# Patient Record
(demographics unavailable — no encounter records)

---

## 2024-10-30 NOTE — HISTORY OF PRESENT ILLNESS
[de-identified] : The patient is a 16 year old male presenting for  FUV for the R shoulder.  Pain:    At Rest: 5/10  With Activity:  6/10  Mechanism of injury:  Patient reports that since scrimmage 8/30/24, the shoulder has been throbbing. No notable injury.  This is not a Work Related Injury being treated under Worker's Compensation. This is not an athletic injury occurring associated with an interscholastic or organized sports team. 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 Reports inc pain and apprehension for dislocation with overhead motions or reaching behind his back.

## 2024-10-30 NOTE — DATA REVIEWED
[FreeTextEntry1] : 10/29/24 OC X-RAY Right Shoulder 3 views: This scan was reviewed and interpreted by Dr. Aldridge, and his findings are- mild OA, Hill-Sachs lesion

## 2024-10-30 NOTE — DISCUSSION/SUMMARY
[de-identified] : Reviewed all X Ray images with patient today and interpretation was provided. 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 ATTENTION DR. FONTAINE. CT Scan to eval glenoid bone loss and hill sachs for surgical planning. Discussed treatment options, including surgery. Patient would like to follow through with surgery. Doctor email provided to patient. Patient will get in contact with the  to schedule surgery.     ***[employer]*** ***Patient wishes to continue to football season***  Surgical Consent:  -Conservative treatment, nontreatment, nonsurgical intervention and surgical intervention treatment options have been reviewed with the patient.  The patient continues to be symptomatic [and has failed conservative treatment], and elects to move forward with surgical intervention.  The patient is indicated for RIGHT SHOULDER REVISION ARTHROSCOPIC ANTERIOR AND SUPERIOR LABRAL REPAIR, REMOVAL OF DEEP HARDWARE, REMPLISSAGE PROCEDURE and all indicated procedures. As such the alternatives, benefits and risks, of the above procedure, including but not limited to bleeding, infection, neurovascular injury, loss of limb, loss of life,  DVT, PE, RSD, inability to return to previous level of activity, inability to return to previous level of employment, advancement of or to osteoarthritic changes, joint instability or motion loss, hardware failure or migration,  failure to resolve all symptoms, failure to return to sports and need for further procedures, as well as specific risk of [RE-TEAR, ARTHROFIBROSIS] were discussed with the patient and/or their legal guardian who agreed to move forward with surgical intervention.  They have reviewed and signed the consent form today after expressing understanding of the above documented conversation. The patient or their representative will contact my office as instructed on the preoperative instruction sheet they received today to schedule surgery in a timely manner as discussed.  -As a post-operative protocol, I am prescribing an iceless cold/heat compression therapy device for at home use to be used 3-5 times per day at 40 degrees for 35 days as an alternative to pain medication. I would like my patient to begin with simultaneous cold & compression therapy at 10mm pressure. At the patients follow up I will determine whether they should continue with cold, or if they should transition to contrast cold/heat compression therapy. Unlike a conventional cold therapy unit that requires ice, the ThermX iceless device is set to a prescribed temperature that it will remain throughout the entire duration of use, whether that be cold compression, heat compression, or contrast compression. Cold therapy units that depend on ice melt over a very short period and do not provide compression which limits the compliance and effectiveness for pain/inflammation reduction that I am targeting for my patient. I have reached out to [facility] to supply this device as they are the exclusive provider of the ThermX and the patient will be contacted and instructed on how to utilize the device. ------------    ----------------------------------------------- Home Exercise The patient is instructed on a home exercise program.  YUAN HEALY Acting as a Scribe for Dr. Luis Carlos FRANCIS, Yuan Healy, attest that this documentation has been prepared under the direction and in the presence of Provider Dr. Aldridge.  Activity Modification The patient was advised to modify their activities.  Dx / Natural History The patient was advised of the diagnosis. The natural history of the pathology was explained in full to the patient in layman's terms. Several different treatment options were discussed and explained in full to the patient including the risks and benefits of both surgical and non-surgical treatments.  All questions and concerns were answered.  Pain Guide Activities The patient was advised to let pain guide the gradual advancement of activities.  RICE I explained to the patient that rest, ice, compression, and elevation would benefit them. They may return to activity after follow-up or when they no longer have any pain.  The patient's current medication management of their orthopedic diagnosis was reviewed today: (1) We discussed a comprehensive treatment plan that included possible pharmaceutical management involving the use of prescription strength medications including but not limited to options such as oral Naprosyn 500mg BID, once daily Meloxicam 15 mg, or 500-650 mg Tylenol versus over the counter oral medications and topical prescription NSAID Pennsaid vs over the counter Voltaren gel. (2) There is a moderate risk of morbidity with further treatment, especially from use of prescription strength medications and possible side effects of these medications which include upset stomach with oral medications, skin reactions to topical medications and cardiac/renal issues with long term use. (3) I recommended that the patient follow-up with their medical physician to discuss any significant specific potential issues with long term medication use such as interactions with current medications or with exacerbation of underlying medical comorbidities. (4) The benefits and risks associated with use of injectable, oral or topical, prescription and over the counter anti-inflammatory medications were discussed with the patient. The patient voiced understanding of the risks including but not limited to bleeding, stroke, kidney dysfunction, heart disease, and were referred to the black box warning label for further information.

## 2024-10-30 NOTE — ADDENDUM
[FreeTextEntry1] : Documented by Osorio Healy acting as a scribe for Dr. Aldridge and Carlos Torres PA-C on 10/29/2024 and was presence for the following sections: Physical Exam; Data Reviewed; Assessment; Discussion/Summary. All medical record entries made by the Scribe were at my, Dr. Aldridge, and Carlos Torres, direction and personally dictated by me on 10/29/2024. I have reviewed the chart and agree that the record accurately reflects my personal performance of the history, physical exam, procedure and imaging.

## 2024-10-30 NOTE — PHYSICAL EXAM
[de-identified] : Neurovascularly intact distally  Right Shoulder: anterior shoulder tenderness +obreins +anterior load and shift +anterior crank test anterior apprehension

## 2024-11-14 NOTE — HISTORY OF PRESENT ILLNESS
[de-identified] : 11/14/2024: The patient is a 16 year old M, R hand dominant who presents today complaining of right shoulder pain. Date of Injury/Onset: 09/06/24 Julio was playing football when he tackled another player and his shoulder was in immediate. Pain:    At Rest: 5/10 With Activity:  7/10 Mechanism of injury: Football Contact injury. This is NOT a Work Related Injury being treated under Worker's Compensation. This IS an athletic injury occurring associated with an interscholastic or organized sports team. Quality of symptoms: Sharp pain and numbness  Improves with: Nothing helps the pain get better. Worse with: OH motions, any acitvity Prior treatment: Dr. Aldridge Prior imaging: X-ray and MRA in chart Previous injury: Had labral arthrorscopy with Dr. 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

## 2024-11-14 NOTE — HISTORY OF PRESENT ILLNESS
[de-identified] : 11/14/2024: The patient is a 16 year old M, R hand dominant who presents today complaining of right shoulder pain. Date of Injury/Onset: 09/06/24 Julio was playing football when he tackled another player and his shoulder was in immediate. Pain:    At Rest: 5/10 With Activity:  7/10 Mechanism of injury: Football Contact injury. This is NOT a Work Related Injury being treated under Worker's Compensation. This IS an athletic injury occurring associated with an interscholastic or organized sports team. Quality of symptoms: Sharp pain and numbness  Improves with: Nothing helps the pain get better. Worse with: OH motions, any acitvity Prior treatment: Dr. Aldridge Prior imaging: X-ray and MRA in chart Previous injury: Had labral arthrorscopy with Dr. 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

## 2024-11-14 NOTE — DATA REVIEWED
[CT Scan] : CT scan [MRI] : MRI [Right] : of the right [Shoulder] : shoulder [Report was reviewed and noted in the chart] : The report was reviewed and noted in the chart [I independently reviewed and interpreted images and report] : I independently reviewed and interpreted images and report [I reviewed the films/CD] : I reviewed the films/CD [FreeTextEntry1] : DARRELL 11/7/24:  CT: 1. Evidence of previous anterior shoulder dislocation. There is a moderate Hill-Sachs deformity at the posterolateral aspect of the humeral head. 2. 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 There is mild medial displacement of the bony Bankart fragment. 3. There is evidence of previous labral repair with suture anchor tracts extending from anterior superior to anterior inferior. 4. Small subchondral cyst along the posterior inferior glenoid corresponds to the region of chondral defect seen on the MR arthrogram performed on the same day.   MRA: 1. Evidence of previous anterior shoulder dislocation with a Hill-Sachs deformity of the humeral head and a chronic bony Bankart lesion. 2. There is a recurrent tear of the anterior inferior labrum and the labral fibers are retracted medially and scarred to the anterior joint capsule. There is also shallow tearing of the superior labrum above the equator. 3. There is moderate cartilage loss at the superior medial aspect of the humeral head and there is a small chondral defect at the posterior inferior glenoid. 4. There is moderate scarring of the joint capsule and irregularity of the joint capsule at the scapular attachment indicative of previous partial tear. 5. There is no rotator cuff tear

## 2024-11-14 NOTE — DISCUSSION/SUMMARY
[de-identified] : We discussed their diagnosis and treatment options at length including the risks and benefits of both surgical and non-surgical options.  - I do think they are a candidate for surgery given their dislocation event, young age, and active lifestyle in order to stabilize their shoulder, decrease their risk of another dislocation event, and prevent further chondral damage and the long term development of arthritis. - The pros and cons along with the risks, benefits, and alternatives to right shoulder surgical arthroscopy and open latarjet were discussed with the patient, all questions were answered  Conservative treatment, nontreatment, nonsurgical intervention and surgical intervention treatment options have been reviewed with the patient.  The patient continues to be symptomatic and has failed conservative treatment, and elects to move forward with surgical intervention.  The patient is indicated for [the above procedure] and all indicated procedures. As such the alternatives, benefits and risks, of the above procedure, including but not limited to bleeding, infection, neurovascular injury, loss of limb, loss of life,  DVT, PE, RSD, inability to return to previous level of activity, inability to return to previous level of employment, advancement of or to osteoarthritic changes, joint instability or motion loss, hardware failure or migration, malunion or nonunion, failure to resolve all symptoms, failure to return to sports and need for further procedures were discussed with the patient and/or their legal guardian who agreed to move forward with surgical intervention.  They have reviewed and signed the consent form today after expressing understanding of the above documented conversation. The patient or their representative will contact my office as instructed on the preoperative instruction sheet they received today to schedule surgery in a timely manner as discussed.

## 2024-11-14 NOTE — IMAGING
[de-identified] :  RIGHT SHOULDER  Inspection: No swelling.  Palpation: Tenderness is noted at the anterior shoulder.  Range of motion: Full range of motion but with some pain. Strength: There is pain and discomfort with strength testing.  Neurological testing: motor and sensor intact distally. Ligament Stability and Special Tests:  Shoulder apprehension: pos Shoulder relocation: pos Obriens test: pos Biceps Active test: pos Tang Labral Shear: pos Impingement testing: neg Melisa testing: pain Whipple: neg Cross Body Adduction: neg

## 2024-11-14 NOTE — IMAGING
[de-identified] :  RIGHT SHOULDER  Inspection: No swelling.  Palpation: Tenderness is noted at the anterior shoulder.  Range of motion: Full range of motion but with some pain. Strength: There is pain and discomfort with strength testing.  Neurological testing: motor and sensor intact distally. Ligament Stability and Special Tests:  Shoulder apprehension: pos Shoulder relocation: pos Obriens test: pos Biceps Active test: pos Tang Labral Shear: pos Impingement testing: neg Melisa testing: pain Whipple: neg Cross Body Adduction: neg

## 2024-11-14 NOTE — DISCUSSION/SUMMARY
[de-identified] : We discussed their diagnosis and treatment options at length including the risks and benefits of both surgical and non-surgical options.  - I do think they are a candidate for surgery given their dislocation event, young age, and active lifestyle in order to stabilize their shoulder, decrease their risk of another dislocation event, and prevent further chondral damage and the long term development of arthritis. - The pros and cons along with the risks, benefits, and alternatives to right shoulder surgical arthroscopy and open latarjet were discussed with the patient, all questions were answered  Conservative treatment, nontreatment, nonsurgical intervention and surgical intervention treatment options have been reviewed with the patient.  The patient continues to be symptomatic and has failed conservative treatment, and elects to move forward with surgical intervention.  The patient is indicated for [the above procedure] and all indicated procedures. As such the alternatives, benefits and risks, of the above procedure, including but not limited to bleeding, infection, neurovascular injury, loss of limb, loss of life,  DVT, PE, RSD, inability to return to previous level of activity, inability to return to previous level of employment, advancement of or to osteoarthritic changes, joint instability or motion loss, hardware failure or migration, malunion or nonunion, failure to resolve all symptoms, failure to return to sports and need for further procedures were discussed with the patient and/or their legal guardian who agreed to move forward with surgical intervention.  They have reviewed and signed the consent form today after expressing understanding of the above documented conversation. The patient or their representative will contact my office as instructed on the preoperative instruction sheet they received today to schedule surgery in a timely manner as discussed.

## 2024-12-09 NOTE — IMAGING
[de-identified] : RIGHT SHOULDER Inspection: incisions c/d/i Palpation: No Tenderness is noted  Range of motion: in sling Strength:  Neurological testing: motor and sensor intact distally. Ligament Stability and Special Tests:  Shoulder apprehension:  Shoulder relocation:  Obriens test: Biceps Active test: Tang Labral Shear:  Impingement testing:  Melisa testing: Cross Body Adduction:

## 2024-12-09 NOTE — IMAGING
[de-identified] : RIGHT SHOULDER Inspection: incisions c/d/i Palpation: No Tenderness is noted  Range of motion: in sling Strength:  Neurological testing: motor and sensor intact distally. Ligament Stability and Special Tests:  Shoulder apprehension:  Shoulder relocation:  Obriens test: Biceps Active test: Tang Labral Shear:  Impingement testing:  Melisa testing: Cross Body Adduction:

## 2024-12-12 NOTE — PHYSICAL EXAM
[de-identified] : RIGHT SHOULDER Inspection: incisions c/d/i Palpation: No Tenderness is noted  Range of motion: in sling Strength:  Neurological testing: motor and sensor intact distally. Ligament Stability and Special Tests:  Shoulder apprehension:  Shoulder relocation:  Obriens test: Biceps Active test: Tang Labral Shear:  Impingement testing:  Melisa testing: Cross Body Adduction:

## 2024-12-12 NOTE — DISCUSSION/SUMMARY
[de-identified] : Right X-Ray Examination of the SHOULDER (2 views): no fractures, subluxations or dislocations. Surgical hardware in place s/p coracoid transfer Right X-Ray Examination of the SCAPULA 1 or 2 views shows: no significant abnormalities   The patient is approximately 2 weeks postoperative. s/p right shoulder laterjet and loose body removal DOS: 11/26/2024  Sutures removed and Steri Strips applied today. The patient is instructed in wound management. The patient's post-op plan, protocol and activity modifications have been thoroughly discussed and the patient expressed understanding. The patient will control pain as discussed & continue ice and elevation as needed. The patient otherwise may advance activity as discussed.   Arthroscopy photos were reviewed in great detail.  Discussed screw placement seen on x-ray with patient and Dad. Discussed inferior screw may not have optimal purchase, and we will continue to monitor this and make sure no migration of screw over time. Discussed potential need for MACEY in future pending future x-rays and progress with PT.   Prescription Medications Ordered: [None]   Physical Therapy: [Start per protocol, new prescription given today, continue home exercise program]   Braces/DME Ordered: [Continue Postop Brace]   Activity/Work/Sports Status: [Out of work/gym/sports]   Follow-Up: 2 weeks with new XR

## 2024-12-12 NOTE — HISTORY OF PRESENT ILLNESS
[de-identified] : 12/09/2024 : patient present today for POV #1 s/p right shoulder laterjet and loose body removal DOS: 11/26/2024 Pain:     At Rest: 0-3/10 With Activity: 8/10 Quality Of Symptoms: pins in needles general shoulder Since last visit: Pt is doing well postoperatively, denies fever/chills/nausea/vomiting. He has been compliant w/ sling.   11/14/2024: The patient is a 16 year old M, R hand dominant who presents today complaining of right shoulder pain. Date of Injury/Onset: 09/06/24 Julio was playing football when he tackled another player and his shoulder was in immediate. Pain:    At Rest: 5/10 With Activity:  7/10 Mechanism of injury: Football Contact injury. This is NOT a Work Related Injury being treated under Worker's Compensation. This IS an athletic injury occurring associated with an interscholastic or organized sports team. Quality of symptoms: Sharp pain and numbness  Improves with: Nothing helps the pain get better. Worse with: OH motions, any acitvity Prior treatment: Dr. Aldridge Prior imaging: X-ray and MRA in chart Previous injury: Had labral arthrorscopy with Dr. 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

## 2024-12-12 NOTE — HISTORY OF PRESENT ILLNESS
[de-identified] : 12/09/2024 : patient present today for POV #1 s/p right shoulder laterjet and loose body removal DOS: 11/26/2024 Pain:     At Rest: 0-3/10 With Activity: 8/10 Quality Of Symptoms: pins in needles general shoulder Since last visit: Pt is doing well postoperatively, denies fever/chills/nausea/vomiting. He has been compliant w/ sling.   11/14/2024: The patient is a 16 year old M, R hand dominant who presents today complaining of right shoulder pain. Date of Injury/Onset: 09/06/24 Julio was playing football when he tackled another player and his shoulder was in immediate. Pain:    At Rest: 5/10 With Activity:  7/10 Mechanism of injury: Football Contact injury. This is NOT a Work Related Injury being treated under Worker's Compensation. This IS an athletic injury occurring associated with an interscholastic or organized sports team. Quality of symptoms: Sharp pain and numbness  Improves with: Nothing helps the pain get better. Worse with: OH motions, any acitvity Prior treatment: Dr. Aldridge Prior imaging: X-ray and MRA in chart Previous injury: Had labral arthrorscopy with Dr. 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

## 2024-12-12 NOTE — DISCUSSION/SUMMARY
[de-identified] : Right X-Ray Examination of the SHOULDER (2 views): no fractures, subluxations or dislocations. Surgical hardware in place s/p coracoid transfer Right X-Ray Examination of the SCAPULA 1 or 2 views shows: no significant abnormalities   The patient is approximately 2 weeks postoperative. s/p right shoulder laterjet and loose body removal DOS: 11/26/2024  Sutures removed and Steri Strips applied today. The patient is instructed in wound management. The patient's post-op plan, protocol and activity modifications have been thoroughly discussed and the patient expressed understanding. The patient will control pain as discussed & continue ice and elevation as needed. The patient otherwise may advance activity as discussed.   Arthroscopy photos were reviewed in great detail.  Discussed screw placement seen on x-ray with patient and Dad. Discussed inferior screw may not have optimal purchase, and we will continue to monitor this and make sure no migration of screw over time. Discussed potential need for MACEY in future pending future x-rays and progress with PT.   Prescription Medications Ordered: [None]   Physical Therapy: [Start per protocol, new prescription given today, continue home exercise program]   Braces/DME Ordered: [Continue Postop Brace]   Activity/Work/Sports Status: [Out of work/gym/sports]   Follow-Up: 2 weeks with new XR

## 2024-12-12 NOTE — PHYSICAL EXAM
[de-identified] : RIGHT SHOULDER Inspection: incisions c/d/i Palpation: No Tenderness is noted  Range of motion: in sling Strength:  Neurological testing: motor and sensor intact distally. Ligament Stability and Special Tests:  Shoulder apprehension:  Shoulder relocation:  Obriens test: Biceps Active test: Tang Labral Shear:  Impingement testing:  Melisa testing: Cross Body Adduction:

## 2024-12-23 NOTE — HISTORY OF PRESENT ILLNESS
[de-identified] : 12/23/2024 : patient present today for POV #2 s/p right shoulder laterjet and loose body removal DOS: 11/26/2024 Pain:     At Rest: 0/10 With Activity: 5/10 Quality Of Symptoms: general shoulder throbbing, worse at night, some persistent pins and needles in the forearm Since last visit: Pt is doing well. He is in PT 2-3x/wk @ professional PT in [facility].   12/09/2024 : patient present today for POV #1 s/p right shoulder laterjet and loose body removal DOS: 11/26/2024 Pain:     At Rest: 0-3/10 With Activity: 8/10 Quality Of Symptoms: pins in needles general shoulder Since last visit: Pt is doing well postoperatively, denies fever/chills/nausea/vomiting. He has been compliant w/ sling.   11/14/2024: The patient is a 16 year old M, R hand dominant who presents today complaining of right shoulder pain. Date of Injury/Onset: 09/06/24 Julio was playing football when he tackled another player and his shoulder was in immediate. Pain:    At Rest: 5/10 With Activity:  7/10 Mechanism of injury: Football Contact injury. This is NOT a Work Related Injury being treated under Worker's Compensation. This IS an athletic injury occurring associated with an interscholastic or organized sports team. Quality of symptoms: Sharp pain and numbness  Improves with: Nothing helps the pain get better. Worse with: OH motions, any acitvity Prior treatment: Dr. Aldridge Prior imaging: X-ray and MRA in chart Previous injury: Had labral arthrorscopy with Dr. 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

## 2024-12-23 NOTE — REASON FOR VISIT
[Parent] : parent [FreeTextEntry2] : POV #2 s/p right shoulder laterjet and loose body removal DOS: 11/26/2024 0

## 2024-12-23 NOTE — PHYSICAL EXAM
[de-identified] : RIGHT SHOULDER Inspection: well healed surg scars Palpation: No Tenderness is noted  Range of motion: range of motion limited, ER to neutral,  Strength: strength is improving Neurological testing: motor and sensor intact distally. Ligament Stability and Special Tests:  Shoulder apprehension: neg Shoulder relocation: neg Obriens test: Biceps Active test: Tang Labral Shear:  Impingement testing:  Melisa testing: Cross Body Adduction:

## 2024-12-23 NOTE — DISCUSSION/SUMMARY
[de-identified] : The patient is approximately 6 weeks postoperative. s/p right shoulder laterjet and loose body removal DOS: 11/26/2024  Patient is with mom. Incision(s) appear to be healing well. The patient is instructed in wound management. The patient's post-op plan, protocol and activity modifications have been thoroughly discussed and the patient expressed understanding. The patient will control pain as discussed & continue ice and elevation as needed. The patient otherwise may advance activity as discussed.  Discussed screw placement seen on previous x-ray with patient and Mom. Discussed inferior screw may not have optimal purchase, and we will continue to monitor this and make sure no migration of screw over time. Discussed potential need for MACEY in future pending future x-rays and progress with PT.    Prescription Medications Ordered: [None]   Physical Therapy: [Continue per protocol, new prescription given today, continue home exercise program]   Braces/DME Ordered: [Continue sling in public, d/c while sleeping]   Activity/Work/Sports Status: [Continue out of work/gym/sports]   Follow-Up: [6 weeks] with new XR

## 2025-01-27 NOTE — DISCUSSION/SUMMARY
[de-identified] : Right X-Ray Examination of the SHOULDER (2 views): no fractures, subluxations or dislocations. Surgical hardware in place Right X-Ray Examination of the SCAPULA 1 or 2 views shows: no significant abnormalities   PT in post op protocol - s/p right shoulder laterjet DOS: 11/26/2024.    - The patient was provided with a prescription for Physical Therapy - Home exercises program learned at physical therapy. - The patient was advised to apply ice (wrapped in a towel or protective covering) to the area daily (20 minutes at a time, 2-4X/day). - Discussed recent XR and that the screw has not moved since last visit.  - Continue out of sports/gym, note provided  Follow up: 8 week re-eval

## 2025-01-27 NOTE — PHYSICAL EXAM
[de-identified] : RIGHT SHOULDER Inspection: well healed surg scars Palpation: No Tenderness is noted  Range of motion: ER 15, active  Strength: Forward Flexion 4/5. Abduction 4/5. External Rotation 4/5 and Internal Rotation 5/5 Neurological testing: motor and sensor intact distally. Ligament Stability and Special Tests:  Shoulder apprehension: neg Shoulder relocation: neg Obriens test: neg Biceps Active test: neg Tang Labral Shear: neg Impingement testing: neg Melisa testing: neg Cross Body Adduction: neg

## 2025-01-27 NOTE — HISTORY OF PRESENT ILLNESS
[de-identified] : 01/27/2025 : patient present today for POV #3 s/p right shoulder laterjet and loose body removal DOS: 11/26/2024 Pain:     At Rest: 0/10 With Activity: 0/10 Quality Of Symptoms: no c/o pain Since last visit: He has been doing PT 2x/wk @ professional PT in [place], working on ROM.   12/23/2024 : patient present today for POV #2 s/p right shoulder laterjet and loose body removal DOS: 11/26/2024 Pain:     At Rest: 0/10 With Activity: 5/10 Quality Of Symptoms: general shoulder throbbing, worse at night, some persistent pins and needles in the forearm Since last visit: Pt is doing well. He is in PT 2-3x/wk @ professional PT in [facility].   12/09/2024 : patient present today for POV #1 s/p right shoulder laterjet and loose body removal DOS: 11/26/2024 Pain:     At Rest: 0-3/10 With Activity: 8/10 Quality Of Symptoms: pins in needles general shoulder Since last visit: Pt is doing well postoperatively, denies fever/chills/nausea/vomiting. He has been compliant w/ sling.   11/14/2024: The patient is a 16 year old M, R hand dominant who presents today complaining of right shoulder pain. Date of Injury/Onset: 09/06/24 Julio was playing football when he tackled another player and his shoulder was in immediate. Pain:    At Rest: 5/10 With Activity:  7/10 Mechanism of injury: Football Contact injury. This is NOT a Work Related Injury being treated under Worker's Compensation. This IS an athletic injury occurring associated with an interscholastic or organized sports team. Quality of symptoms: Sharp pain and numbness  Improves with: Nothing helps the pain get better. Worse with: OH motions, any acitvity Prior treatment: Dr. Aldridge Prior imaging: X-ray and MRA in chart Previous injury: Had labral arthrorscopy with Dr. 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

## 2025-03-24 NOTE — HISTORY OF PRESENT ILLNESS
[de-identified] : 03/17/2025: Patient is 4 months s/p right shoulder laterjet and loose body removal (DOS: 11/26/2024). Pain and symptoms are better. Since last visit pt continues to attend physical therapy 2x a week with significant improvement in range of motion and strength.  01/27/2025 : patient present today for POV #3 s/p right shoulder laterjet and loose body removal DOS: 11/26/2024 Pain:     At Rest: 0/10 With Activity: 0/10 Quality Of Symptoms: no c/o pain Since last visit: He has been doing PT 2x/wk @ professional PT in [place], working on ROM.   12/23/2024 : patient present today for POV #2 s/p right shoulder laterjet and loose body removal DOS: 11/26/2024 Pain:     At Rest: 0/10 With Activity: 5/10 Quality Of Symptoms: general shoulder throbbing, worse at night, some persistent pins and needles in the forearm Since last visit: Pt is doing well. He is in PT 2-3x/wk @ professional PT in [facility].   12/09/2024 : patient present today for POV #1 s/p right shoulder laterjet and loose body removal DOS: 11/26/2024 Pain:     At Rest: 0-3/10 With Activity: 8/10 Quality Of Symptoms: pins in needles general shoulder Since last visit: Pt is doing well postoperatively, denies fever/chills/nausea/vomiting. He has been compliant w/ sling.   11/14/2024: The patient is a 16 year old M, R hand dominant who presents today complaining of right shoulder pain. Date of Injury/Onset: 09/06/24 Julio was playing football when he tackled another player and his shoulder was in immediate. Pain:    At Rest: 5/10 With Activity:  7/10 Mechanism of injury: Football Contact injury. This is NOT a Work Related Injury being treated under Worker's Compensation. This IS an athletic injury occurring associated with an interscholastic or organized sports team. Quality of symptoms: Sharp pain and numbness  Improves with: Nothing helps the pain get better. Worse with: OH motions, any acitvity Prior treatment: Dr. Aldridge Prior imaging: X-ray and MRA in chart Previous injury: Had labral arthrorscopy with Dr. 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

## 2025-03-24 NOTE — DISCUSSION/SUMMARY
[de-identified] : PT in post op protocol - s/p right shoulder laterjet, including PT for cervical spine DOS: 11/26/2024.    - The patient was provided with a prescription for Physical Therapy - Home exercises program learned at physical therapy. - The patient was advised to apply ice (wrapped in a towel or protective covering) to the area daily (20 minutes at a time, 2-4X/day). - Discussed repeat x-ray at next visit - Continue out of sports/gym, note provided  Follow up: 6 week re-eval and new x-ray

## 2025-03-24 NOTE — DISCUSSION/SUMMARY
[de-identified] : PT in post op protocol - s/p right shoulder laterjet, including PT for cervical spine DOS: 11/26/2024.    - The patient was provided with a prescription for Physical Therapy - Home exercises program learned at physical therapy. - The patient was advised to apply ice (wrapped in a towel or protective covering) to the area daily (20 minutes at a time, 2-4X/day). - Discussed repeat x-ray at next visit - Continue out of sports/gym, note provided  Follow up: 6 week re-eval and new x-ray

## 2025-03-24 NOTE — DISCUSSION/SUMMARY
[de-identified] : PT in post op protocol - s/p right shoulder laterjet, including PT for cervical spine DOS: 11/26/2024.    - The patient was provided with a prescription for Physical Therapy - Home exercises program learned at physical therapy. - The patient was advised to apply ice (wrapped in a towel or protective covering) to the area daily (20 minutes at a time, 2-4X/day). - Discussed repeat x-ray at next visit - Continue out of sports/gym, note provided  Follow up: 6 week re-eval and new x-ray

## 2025-03-24 NOTE — HISTORY OF PRESENT ILLNESS
[de-identified] : 03/17/2025: Patient is 4 months s/p right shoulder laterjet and loose body removal (DOS: 11/26/2024). Pain and symptoms are better. Since last visit pt continues to attend physical therapy 2x a week with significant improvement in range of motion and strength.  01/27/2025 : patient present today for POV #3 s/p right shoulder laterjet and loose body removal DOS: 11/26/2024 Pain:     At Rest: 0/10 With Activity: 0/10 Quality Of Symptoms: no c/o pain Since last visit: He has been doing PT 2x/wk @ professional PT in [place], working on ROM.   12/23/2024 : patient present today for POV #2 s/p right shoulder laterjet and loose body removal DOS: 11/26/2024 Pain:     At Rest: 0/10 With Activity: 5/10 Quality Of Symptoms: general shoulder throbbing, worse at night, some persistent pins and needles in the forearm Since last visit: Pt is doing well. He is in PT 2-3x/wk @ professional PT in [facility].   12/09/2024 : patient present today for POV #1 s/p right shoulder laterjet and loose body removal DOS: 11/26/2024 Pain:     At Rest: 0-3/10 With Activity: 8/10 Quality Of Symptoms: pins in needles general shoulder Since last visit: Pt is doing well postoperatively, denies fever/chills/nausea/vomiting. He has been compliant w/ sling.   11/14/2024: The patient is a 16 year old M, R hand dominant who presents today complaining of right shoulder pain. Date of Injury/Onset: 09/06/24 Julio was playing football when he tackled another player and his shoulder was in immediate. Pain:    At Rest: 5/10 With Activity:  7/10 Mechanism of injury: Football Contact injury. This is NOT a Work Related Injury being treated under Worker's Compensation. This IS an athletic injury occurring associated with an interscholastic or organized sports team. Quality of symptoms: Sharp pain and numbness  Improves with: Nothing helps the pain get better. Worse with: OH motions, any acitvity Prior treatment: Dr. Aldridge Prior imaging: X-ray and MRA in chart Previous injury: Had labral arthrorscopy with Dr. 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

## 2025-03-24 NOTE — PHYSICAL EXAM
[de-identified] : RIGHT SHOULDER Inspection: well healed surg scars Palpation: No Tenderness is noted  Range of motion: ER 15, active ; IR to L4; no mechanical symptoms Strength: Forward Flexion 4/5. Abduction 4/5. External Rotation 4/5 and Internal Rotation 5/5 Neurological testing: motor and sensor intact distally. Ligament Stability and Special Tests:  Shoulder apprehension: neg Shoulder relocation: neg Obriens test: neg Biceps Active test: neg Tang Labral Shear: neg Impingement testing: neg Melisa testing: neg Cross Body Adduction: neg

## 2025-03-24 NOTE — HISTORY OF PRESENT ILLNESS
[de-identified] : 03/17/2025: Patient is 4 months s/p right shoulder laterjet and loose body removal (DOS: 11/26/2024). Pain and symptoms are better. Since last visit pt continues to attend physical therapy 2x a week with significant improvement in range of motion and strength.  01/27/2025 : patient present today for POV #3 s/p right shoulder laterjet and loose body removal DOS: 11/26/2024 Pain:     At Rest: 0/10 With Activity: 0/10 Quality Of Symptoms: no c/o pain Since last visit: He has been doing PT 2x/wk @ professional PT in [place], working on ROM.   12/23/2024 : patient present today for POV #2 s/p right shoulder laterjet and loose body removal DOS: 11/26/2024 Pain:     At Rest: 0/10 With Activity: 5/10 Quality Of Symptoms: general shoulder throbbing, worse at night, some persistent pins and needles in the forearm Since last visit: Pt is doing well. He is in PT 2-3x/wk @ professional PT in [facility].   12/09/2024 : patient present today for POV #1 s/p right shoulder laterjet and loose body removal DOS: 11/26/2024 Pain:     At Rest: 0-3/10 With Activity: 8/10 Quality Of Symptoms: pins in needles general shoulder Since last visit: Pt is doing well postoperatively, denies fever/chills/nausea/vomiting. He has been compliant w/ sling.   11/14/2024: The patient is a 16 year old M, R hand dominant who presents today complaining of right shoulder pain. Date of Injury/Onset: 09/06/24 Julio was playing football when he tackled another player and his shoulder was in immediate. Pain:    At Rest: 5/10 With Activity:  7/10 Mechanism of injury: Football Contact injury. This is NOT a Work Related Injury being treated under Worker's Compensation. This IS an athletic injury occurring associated with an interscholastic or organized sports team. Quality of symptoms: Sharp pain and numbness  Improves with: Nothing helps the pain get better. Worse with: OH motions, any acitvity Prior treatment: Dr. Aldridge Prior imaging: X-ray and MRA in chart Previous injury: Had labral arthrorscopy with Dr. 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

## 2025-03-24 NOTE — PHYSICAL EXAM
[de-identified] : RIGHT SHOULDER Inspection: well healed surg scars Palpation: No Tenderness is noted  Range of motion: ER 15, active ; IR to L4; no mechanical symptoms Strength: Forward Flexion 4/5. Abduction 4/5. External Rotation 4/5 and Internal Rotation 5/5 Neurological testing: motor and sensor intact distally. Ligament Stability and Special Tests:  Shoulder apprehension: neg Shoulder relocation: neg Obriens test: neg Biceps Active test: neg Tang Labral Shear: neg Impingement testing: neg Melisa testing: neg Cross Body Adduction: neg

## 2025-03-24 NOTE — PHYSICAL EXAM
[de-identified] : RIGHT SHOULDER Inspection: well healed surg scars Palpation: No Tenderness is noted  Range of motion: ER 15, active ; IR to L4; no mechanical symptoms Strength: Forward Flexion 4/5. Abduction 4/5. External Rotation 4/5 and Internal Rotation 5/5 Neurological testing: motor and sensor intact distally. Ligament Stability and Special Tests:  Shoulder apprehension: neg Shoulder relocation: neg Obriens test: neg Biceps Active test: neg Tang Labral Shear: neg Impingement testing: neg Melisa testing: neg Cross Body Adduction: neg

## 2025-06-09 NOTE — PHYSICAL EXAM
[de-identified] : RIGHT SHOULDER Inspection: well healed surg scars Palpation: No Tenderness is noted  Range of motion:  ER 30, IR to L4; no mechanical symptoms Strength: 5/5n throughout Neurological testing: motor and sensor intact distally. Ligament Stability and Special Tests:  Shoulder apprehension: neg Shoulder relocation: neg Obriens test: neg Biceps Active test: neg Tang Labral Shear: neg Impingement testing: neg Melisa testing: neg Cross Body Adduction: neg

## 2025-06-09 NOTE — DISCUSSION/SUMMARY
[de-identified] : Patient is a 17 year y/o male about 6 months - s/p right shoulder laterjet DOS: 11/26/2024.   The patient's post-op plan, protocol and activity modifications have been thoroughly discussed and the patient expressed understanding. The patient will control pain as discussed & continue ice and elevation as needed. The patient otherwise may advance activity as discussed.  - Incisions appear well healed. - The patient will continue home exercises program learned at physical therapy. - Patient is cleared for work/gym/sports, note provided - Discussed wearing mauro brace during sports/activities, rx provided.   Follow up: end of August/early September

## 2025-06-09 NOTE — HISTORY OF PRESENT ILLNESS
[de-identified] : 06/02/2025: Pt is s/p right shoulder laterjet and loose body removal (DOS: 11/26/2024). Pain and symptoms are the same, Since last visit pt continues to attend physical therapy 2x a week at [facility] with significant improvement.   03/17/2025: Patient is 4 months s/p right shoulder laterjet and loose body removal (DOS: 11/26/2024). Pain and symptoms are better. Since last visit pt continues to attend physical therapy 2x a week with significant improvement in range of motion and strength.  01/27/2025 : patient present today for POV #3 s/p right shoulder laterjet and loose body removal DOS: 11/26/2024 Pain:     At Rest: 0/10 With Activity: 0/10 Quality Of Symptoms: no c/o pain Since last visit: He has been doing PT 2x/wk @ professional PT in [place], working on ROM.   12/23/2024 : patient present today for POV #2 s/p right shoulder laterjet and loose body removal DOS: 11/26/2024 Pain:     At Rest: 0/10 With Activity: 5/10 Quality Of Symptoms: general shoulder throbbing, worse at night, some persistent pins and needles in the forearm Since last visit: Pt is doing well. He is in PT 2-3x/wk @ professional PT in [facility].   12/09/2024 : patient present today for POV #1 s/p right shoulder laterjet and loose body removal DOS: 11/26/2024 Pain:     At Rest: 0-3/10 With Activity: 8/10 Quality Of Symptoms: pins in needles general shoulder Since last visit: Pt is doing well postoperatively, denies fever/chills/nausea/vomiting. He has been compliant w/ sling.   11/14/2024: The patient is a 16 year old M, R hand dominant who presents today complaining of right shoulder pain. Date of Injury/Onset: 09/06/24 Julio was playing football when he tackled another player and his shoulder was in immediate. Pain:    At Rest: 5/10 With Activity:  7/10 Mechanism of injury: Football Contact injury. This is NOT a Work Related Injury being treated under Worker's Compensation. This IS an athletic injury occurring associated with an interscholastic or organized sports team. Quality of symptoms: Sharp pain and numbness  Improves with: Nothing helps the pain get better. Worse with: OH motions, any acitvity Prior treatment: Dr. Aldridge Prior imaging: X-ray and MRA in chart Previous injury: Had labral arthrorscopy with Dr. 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

## 2025-06-09 NOTE — DISCUSSION/SUMMARY
[de-identified] : Patient is a 17 year y/o male about 6 months - s/p right shoulder laterjet DOS: 11/26/2024.   The patient's post-op plan, protocol and activity modifications have been thoroughly discussed and the patient expressed understanding. The patient will control pain as discussed & continue ice and elevation as needed. The patient otherwise may advance activity as discussed.  - Incisions appear well healed. - The patient will continue home exercises program learned at physical therapy. - Patient is cleared for work/gym/sports, note provided - Discussed wearing mauro brace during sports/activities, rx provided.   Follow up: end of August/early September

## 2025-06-09 NOTE — PHYSICAL EXAM
[de-identified] : RIGHT SHOULDER Inspection: well healed surg scars Palpation: No Tenderness is noted  Range of motion:  ER 30, IR to L4; no mechanical symptoms Strength: 5/5n throughout Neurological testing: motor and sensor intact distally. Ligament Stability and Special Tests:  Shoulder apprehension: neg Shoulder relocation: neg Obriens test: neg Biceps Active test: neg Tang Labral Shear: neg Impingement testing: neg Melisa testing: neg Cross Body Adduction: neg

## 2025-06-09 NOTE — HISTORY OF PRESENT ILLNESS
[de-identified] : 06/02/2025: Pt is s/p right shoulder laterjet and loose body removal (DOS: 11/26/2024). Pain and symptoms are the same, Since last visit pt continues to attend physical therapy 2x a week at [facility] with significant improvement.   03/17/2025: Patient is 4 months s/p right shoulder laterjet and loose body removal (DOS: 11/26/2024). Pain and symptoms are better. Since last visit pt continues to attend physical therapy 2x a week with significant improvement in range of motion and strength.  01/27/2025 : patient present today for POV #3 s/p right shoulder laterjet and loose body removal DOS: 11/26/2024 Pain:     At Rest: 0/10 With Activity: 0/10 Quality Of Symptoms: no c/o pain Since last visit: He has been doing PT 2x/wk @ professional PT in [place], working on ROM.   12/23/2024 : patient present today for POV #2 s/p right shoulder laterjet and loose body removal DOS: 11/26/2024 Pain:     At Rest: 0/10 With Activity: 5/10 Quality Of Symptoms: general shoulder throbbing, worse at night, some persistent pins and needles in the forearm Since last visit: Pt is doing well. He is in PT 2-3x/wk @ professional PT in [facility].   12/09/2024 : patient present today for POV #1 s/p right shoulder laterjet and loose body removal DOS: 11/26/2024 Pain:     At Rest: 0-3/10 With Activity: 8/10 Quality Of Symptoms: pins in needles general shoulder Since last visit: Pt is doing well postoperatively, denies fever/chills/nausea/vomiting. He has been compliant w/ sling.   11/14/2024: The patient is a 16 year old M, R hand dominant who presents today complaining of right shoulder pain. Date of Injury/Onset: 09/06/24 Julio was playing football when he tackled another player and his shoulder was in immediate. Pain:    At Rest: 5/10 With Activity:  7/10 Mechanism of injury: Football Contact injury. This is NOT a Work Related Injury being treated under Worker's Compensation. This IS an athletic injury occurring associated with an interscholastic or organized sports team. Quality of symptoms: Sharp pain and numbness  Improves with: Nothing helps the pain get better. Worse with: OH motions, any acitvity Prior treatment: Dr. Aldridge Prior imaging: X-ray and MRA in chart Previous injury: Had labral arthrorscopy with Dr. 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